# Patient Record
Sex: FEMALE | Race: WHITE | NOT HISPANIC OR LATINO | Employment: FULL TIME | URBAN - METROPOLITAN AREA
[De-identification: names, ages, dates, MRNs, and addresses within clinical notes are randomized per-mention and may not be internally consistent; named-entity substitution may affect disease eponyms.]

---

## 2018-12-29 ENCOUNTER — OFFICE VISIT (OUTPATIENT)
Dept: URGENT CARE | Facility: CLINIC | Age: 53
End: 2018-12-29
Payer: COMMERCIAL

## 2018-12-29 ENCOUNTER — APPOINTMENT (OUTPATIENT)
Dept: RADIOLOGY | Facility: CLINIC | Age: 53
End: 2018-12-29
Payer: COMMERCIAL

## 2018-12-29 VITALS
RESPIRATION RATE: 16 BRPM | SYSTOLIC BLOOD PRESSURE: 110 MMHG | DIASTOLIC BLOOD PRESSURE: 70 MMHG | OXYGEN SATURATION: 99 % | TEMPERATURE: 98.4 F | WEIGHT: 168 LBS | HEART RATE: 63 BPM

## 2018-12-29 DIAGNOSIS — J40 BRONCHITIS: Primary | ICD-10-CM

## 2018-12-29 DIAGNOSIS — R05.9 COUGH: ICD-10-CM

## 2018-12-29 DIAGNOSIS — J40 BRONCHITIS: ICD-10-CM

## 2018-12-29 PROCEDURE — 99213 OFFICE O/P EST LOW 20 MIN: CPT | Performed by: NURSE PRACTITIONER

## 2018-12-29 PROCEDURE — 71046 X-RAY EXAM CHEST 2 VIEWS: CPT

## 2018-12-29 RX ORDER — BENZONATATE 200 MG/1
200 CAPSULE ORAL 3 TIMES DAILY PRN
Qty: 20 CAPSULE | Refills: 0 | Status: SHIPPED | OUTPATIENT
Start: 2018-12-29

## 2018-12-29 RX ORDER — ALBUTEROL SULFATE 90 UG/1
2 AEROSOL, METERED RESPIRATORY (INHALATION) EVERY 6 HOURS PRN
COMMUNITY

## 2018-12-29 RX ORDER — PREDNISONE 20 MG/1
20 TABLET ORAL DAILY
Qty: 5 TABLET | Refills: 0 | Status: SHIPPED | OUTPATIENT
Start: 2018-12-29

## 2018-12-29 RX ORDER — ALBUTEROL SULFATE 90 UG/1
2 AEROSOL, METERED RESPIRATORY (INHALATION) EVERY 6 HOURS PRN
Qty: 18 G | Refills: 0 | Status: SHIPPED | OUTPATIENT
Start: 2018-12-29

## 2018-12-29 NOTE — PATIENT INSTRUCTIONS
Preliminary xray reviewed  No apparent pneumonia, however awaiting final report from radiologist    Resume Advair twice daily  Rescue inhaler 2 puffs every 4-6 hours as needed for shortness breath, chest tightness, bronchospasm, coughing fits  Tessalon perles as needed for cough  Prednisone 20mg daily with food for 5 days  Fluids  Rest    Tylenol/motrin as needed  Follow up with PCP in 3-5 days

## 2018-12-29 NOTE — PROGRESS NOTES
[facility]        NAME: Radhika Farfan is a 52 y o  female  : 1965    MRN: [id number]  DATE: 2018  TIME: 2:07 PM    Assessment and Plan     1  Bronchitis  - XR chest pa & lateral; Future  Preliminary xray reviewed  No apparent pneumonia, however awaiting final report from radiologist  Pt made aware of same  - benzonatate (TESSALON) 200 MG capsule; Take 1 capsule (200 mg total) by mouth 3 (three) times a day as needed for cough  Dispense: 20 capsule; Refill: 0  - predniSONE 20 mg tablet; Take 1 tablet (20 mg total) by mouth daily  Dispense: 5 tablet; Refill: 0  - albuterol (VENTOLIN HFA) 90 mcg/act inhaler; Inhale 2 puffs every 6 (six) hours as needed for wheezing  Dispense: 18 g; Refill: 0  - fluticasone-salmeterol (ADVAIR) 100-50 mcg/dose inhaler; Inhale 1 puff 2 (two) times a day Rinse mouth after use  Dispense: 1 Inhaler; Refill: 0  2  Cough  - XR chest pa & lateral; Future  - benzonatate (TESSALON) 200 MG capsule; Take 1 capsule (200 mg total) by mouth 3 (three) times a day as needed for cough  Dispense: 20 capsule; Refill: 0  - predniSONE 20 mg tablet; Take 1 tablet (20 mg total) by mouth daily  Dispense: 5 tablet; Refill: 0  - albuterol (VENTOLIN HFA) 90 mcg/act inhaler; Inhale 2 puffs every 6 (six) hours as needed for wheezing  Dispense: 18 g; Refill: 0      Patient Instructions     Preliminary xray reviewed  No apparent pneumonia, however awaiting final report from radiologist    Resume Advair twice daily  Rescue inhaler 2 puffs every 4-6 hours as needed for shortness breath, chest tightness, bronchospasm, coughing fits  Tessalon perles as needed for cough  Prednisone 20mg daily with food for 5 days  Fluids  Rest    Tylenol/motrin as needed  Follow up with PCP in 3-5 days           Chief Complaint     Chief Complaint   Patient presents with    Cold Like Symptoms      started 2 weeks ago; body aches, weakness, cough; new onset of symptoms cough, chest tightness with cough, hoarse voice         History of Present Illness       Symptoms for 2 weeks  Cough  Tightness in chest  Feels sob  Exhausted  Achey  Feels generally weak  No nasal congestion, no pnd  No sore throat  Took mucinex DM  History of asthma  Non smoker  History of pneumonia  Chronic bronchitis , gets at least twice a year  Has rescue inhaler and advair, but has not used  Both are   Were prescribed last year  Review of Systems   Review of Systems   Constitutional: Positive for fatigue  Negative for chills, diaphoresis and fever  HENT: Negative for congestion, sinus pain and sinus pressure  Respiratory: Positive for cough, chest tightness, shortness of breath and wheezing  Cardiovascular: Negative for chest pain, palpitations and leg swelling  Gastrointestinal: Negative for abdominal pain, diarrhea, nausea and vomiting  Musculoskeletal: Positive for myalgias  Negative for back pain  Skin: Negative for color change and pallor  Neurological: Negative for dizziness, weakness and headaches  Hematological: Negative for adenopathy           Current Medications       Current Outpatient Prescriptions:     albuterol (PROVENTIL HFA,VENTOLIN HFA) 90 mcg/act inhaler, Inhale 2 puffs every 6 (six) hours as needed for wheezing, Disp: , Rfl:     fluticasone-salmeterol (ADVAIR) 100-50 mcg/dose inhaler, Inhale 1 puff 2 (two) times a day Rinse mouth after use , Disp: , Rfl:     Current Allergies     Allergies as of 2018 - Reviewed 2018   Allergen Reaction Noted    Biaxin [clarithromycin] Anaphylaxis 2018    Penicillins Anaphylaxis 2018    Tetracycline Hives 2018            The following portions of the patient's history were reviewed and updated as appropriate: allergies, current medications, past family history, past medical history, past social history, past surgical history and problem list      Past Medical History:   Diagnosis Date    Asthma     High cholesterol        Past Surgical History:   Procedure Laterality Date    BREAST SURGERY       SECTION      x 2    KNEE SURGERY         Family History   Problem Relation Age of Onset    Hyperlipidemia Mother     Hypertension Mother     Hypertension Father     Atrial fibrillation Father          Medications have been verified  Objective   /70   Pulse 63   Temp 98 4 °F (36 9 °C)   Resp 16   Wt 76 2 kg (168 lb)   SpO2 99%        Physical Exam     Physical Exam   Constitutional: She is oriented to person, place, and time  She appears well-developed and well-nourished  No distress  HENT:   TMS WNL  Turbinates not inflamed  Oropharynx with no erythema or exudate  No sinus tenderness to palpation  (-) PND     Cardiovascular: Normal rate and regular rhythm  Pulmonary/Chest: Effort normal and breath sounds normal  No respiratory distress  She has no wheezes  Tight, non productive cough noted   Lymphadenopathy:     She has no cervical adenopathy  Neurological: She is alert and oriented to person, place, and time  Skin: Skin is warm and dry  Vitals reviewed  Preliminary xray reviewed  No apparent pneumonia, however awaiting final report from radiologist  Pt made aware of same